# Patient Record
Sex: FEMALE | Race: BLACK OR AFRICAN AMERICAN | NOT HISPANIC OR LATINO | Employment: FULL TIME | URBAN - METROPOLITAN AREA
[De-identification: names, ages, dates, MRNs, and addresses within clinical notes are randomized per-mention and may not be internally consistent; named-entity substitution may affect disease eponyms.]

---

## 2019-10-29 DIAGNOSIS — M25.562 BILATERAL KNEE PAIN: Primary | ICD-10-CM

## 2019-10-29 DIAGNOSIS — M25.569 PAIN IN KNEE JOINT: ICD-10-CM

## 2019-10-29 DIAGNOSIS — M25.561 BILATERAL KNEE PAIN: Primary | ICD-10-CM

## 2019-10-30 DIAGNOSIS — M25.562 BILATERAL KNEE PAIN: Primary | ICD-10-CM

## 2019-10-30 DIAGNOSIS — M25.561 BILATERAL KNEE PAIN: Primary | ICD-10-CM

## 2019-10-30 DIAGNOSIS — M25.569 KNEE PAIN: ICD-10-CM

## 2019-11-11 DIAGNOSIS — M25.561 BILATERAL KNEE PAIN: Primary | ICD-10-CM

## 2019-11-11 DIAGNOSIS — M25.562 BILATERAL KNEE PAIN: Primary | ICD-10-CM

## 2019-11-11 DIAGNOSIS — M25.569 KNEE PAIN: ICD-10-CM

## 2019-11-29 ENCOUNTER — HOSPITAL ENCOUNTER (OUTPATIENT)
Dept: RADIOLOGY | Facility: HOSPITAL | Age: 54
Discharge: HOME | End: 2019-11-29
Attending: INTERNAL MEDICINE
Payer: MEDICAID

## 2019-11-29 DIAGNOSIS — M25.562 BILATERAL KNEE PAIN: ICD-10-CM

## 2019-11-29 DIAGNOSIS — M25.569 PAIN IN KNEE JOINT: ICD-10-CM

## 2019-11-29 DIAGNOSIS — M25.561 BILATERAL KNEE PAIN: ICD-10-CM

## 2019-11-29 PROCEDURE — 73564 XR KNEE COMP 4 OR MORE VIEWS BILAT: ICD-10-PCS | Mod: 26,50,, | Performed by: RADIOLOGY

## 2019-11-29 PROCEDURE — 73564 X-RAY EXAM KNEE 4 OR MORE: CPT | Mod: 50,TC,FY

## 2019-11-29 PROCEDURE — 73564 X-RAY EXAM KNEE 4 OR MORE: CPT | Mod: 26,50,, | Performed by: RADIOLOGY

## 2020-02-27 ENCOUNTER — CLINICAL SUPPORT (OUTPATIENT)
Dept: REHABILITATION | Facility: HOSPITAL | Age: 55
End: 2020-02-27
Payer: MEDICAID

## 2020-02-27 DIAGNOSIS — R29.898 WEAKNESS OF BOTH LOWER EXTREMITIES: ICD-10-CM

## 2020-02-27 DIAGNOSIS — M25.562 CHRONIC PAIN OF BOTH KNEES: ICD-10-CM

## 2020-02-27 DIAGNOSIS — R26.89 ANTALGIC GAIT: ICD-10-CM

## 2020-02-27 DIAGNOSIS — M25.661 DECREASED RANGE OF MOTION (ROM) OF BOTH KNEES: ICD-10-CM

## 2020-02-27 DIAGNOSIS — M25.662 DECREASED RANGE OF MOTION (ROM) OF BOTH KNEES: ICD-10-CM

## 2020-02-27 DIAGNOSIS — G89.29 CHRONIC PAIN OF BOTH KNEES: ICD-10-CM

## 2020-02-27 DIAGNOSIS — M25.561 CHRONIC PAIN OF BOTH KNEES: ICD-10-CM

## 2020-02-27 PROCEDURE — 97161 PT EVAL LOW COMPLEX 20 MIN: CPT | Mod: PO

## 2020-02-27 NOTE — PLAN OF CARE
"[facility]  Physical Therapy Initial Evaluation    Name: Susan Strong  Clinic Number: [id number]    Therapy Diagnosis:   Encounter Diagnoses   Name Primary?    Chronic pain of both knees     Decreased range of motion (ROM) of both knees     Antalgic gait     Weakness of both lower extremities      Physician: Mason Ye MD    Physician Orders: PT Eval and Treat   Medical Diagnosis from Referral: Bilateral knee pain (M25.561,M25.562); Pain in joint, lower leg (M25.569)  Evaluation Date: 2/27/2020  Authorization Period Expiration: 01/20/2021  Plan of Care Expiration: 05/21/2020  Visit # / Visits authorized: 1/ 1    Time In: 2:05 pm  Time Out: 3:10 pm  Total Billable Time: 65 minutes    Precautions: Standard and Fall    Subjective   Date of onset: Ongoing, chronic complaints of knee pain  History of current condition - SUSAN reports: problems with her knees for years.  She feels like her knees are "out of place" which can cause her to fall at times. Patient reports popping and clicking in knees. She has begun to notice increasing difficulty bending and climbing stairs.  She has trouble picking things up from the floor due to knee pain.  She reports swelling in knees.  She has noticed that she has more pain now than before which causes decreased walking speeds, unable to run.  Patient reports the last time she ran was 13-14 years ago and she stopped due to knee pain and giving out of both knees.  Patient reports symptoms bilaterally but the L is worse than the R.  Tingling and numbness reported behind B knees. Increased weight gain over the past 3 months of about 10 lbs, Denies change in bowel and bladder, reports some change in sensation in groin and saddle region as she reports an ache or cramping sensation that started a few years ago; blurry vision, dizziness and headaches that began about 13-14 years ago.  Patient reports occasionally using a cane for increased stability " when she goes out into the community.  Reports difficulty turning. Patient states she has fallen about 5x in the last 6 months with most recent being a couple weeks ago.  Knee pain and instability can interefere with taking care of her mother as she requires a lot of physical assistance.           Medical History:   No past medical history on file.   Hypertension - currently on medication    Surgical History:   Susan Strong  has no past surgical history on file.    years ago    Medications:   Susan currently has no medications in their medication list.   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  Prior Level of Function: Pt independent with all ADLs, job responsibilities and participating in regular physical activity   Current Level of Function: Pt has difficulty with all ADLs, job responsibilities and participating in regular physical activity     Pain:  Current 5/10, worst 10/10, best 5/10   Location: bilateral knee with L>R  Description: Aching, Throbbing, Numb and clicking and popping causing her knees to give out  Aggravating Factors: Standing, Bending, Walking, Night Time and stairs  Easing Factors: Cortisone provided temporary relief that returned; massage     Pts goals: going to the park with grandkids, walking, biking, stairs; decreasing pain    Objective     Observation:    Patient ambulates into clinic without AD; genu valgum in standing with slight APT and pronation at the feet; patient has a wide resting PRANAY     Palpation:  TTP with moderate palpation over joint line bilaterally with R>L    Flexibility:    Decreased hip and knee flexibility with pain globally affecting ROM       Range of Motion: P! = pain  Patient had pain with PROM before reaching end range.      Right AROM Left AROM   Hip - General Limited with P!  Limited with P!    Knee     Flexion 113 P!  110 P!    Extension -2 P!  -1 P!    Ankle - General WFL but P!  WFL but P!        MMT/Strength: P!= Pain    Hip Right Left   Flexion 4-/5 P! With extensor lag 3+/5 P! With extensor lag   Extension 3-/5 P!  3-/5 P!    Internal Rotation 4-/5 P!  3/5 P!    External Rotation 3-/5 P!  3-/5 P!    Adduction 3/5 P!  3-/5 P!    Abduction 3/5 3/5 P!    Knee     Flexion 4/5 P!  4-/5 P!    Extension 4/5 P!  4-/5 P!    Ankle     Dorsiflexion 3+/5 3+/5   Plantarflexion 3/5 3/5     Special Tests:     Left Right   Apley's Compression  (+) (+)   Apley's Distraction (+) (+)   Thessaly's Test (+) (+)   Patellar Grind Test (+) (-)     Joint mobility:  Superior patellar glides: hypomobile with pain over joint line, and around the superior and inferior poles of patella bilaterally  Inferior patellar glides: hypomobile with pain around superior patellar poles bilaterally  Medial/Lateral patellar glides: slight hypomobility    Other:   Gait: antalgic gait without AD in clinic; wide PRANAY with genu valgum; decreased heel strike bilaterally        CMS Impairment/Limitation/Restriction for FOTO Knee Survey    Therapist reviewed FOTO scores for Susan Strong on 2/27/2020.   FOTO documents entered into [employer] - see Media section.    Limitation Score: 74%  Category: Mobility    Current : CL = least 60% but < 80% impaired, limited or restricted  Goal: CK = at least 40% but < 60% impaired, limited or restricted             TREATMENT     Home Exercises and Patient Education Provided    Education provided:   Patient was educated on initial evaluation findings and expectations as well as future PT services, procedures, and expectations for optimal compliance with therapy.   - benefits of  physical therapy  - normal tissue reactions from IE/treatments  - additional imaging as needed    Written Home Exercises Provided: HEP will be provided at first treatment visit.     Assessment   Susan is a 55 y.o. female referred to outpatient Physical Therapy with a medical diagnosis of Bilateral knee pain; Pain in joint, lower leg. Pt presents with decreased ROM, strength, flexibility, reports of popping and clicking in B Knees in addition to positive special tests indicating potential meniscal involvement bilaterally as well as a positive patellar grind test on the L indicating patellofemoral pain, TTP with moderate palpation of joint line bilaterally with R>L, poor posture and increased pain causing difficulty walking and decreased participation with work, recreational and houseold duties.       Pt prognosis is Good.   Pt will benefit from skilled outpatient Physical Therapy to address the deficits stated above and in the chart below, provide pt/family education, and to maximize pt's level of independence to return to PLOF.     Plan of care discussed with patient: Yes  Pt's spiritual, cultural and educational needs considered and patient is agreeable to the plan of care and goals as stated below:     Anticipated Barriers for therapy: chronic condition, weight    Medical Necessity is demonstrated by the following  History  Co-morbidities and personal factors that may impact the plan of care Co-morbidities:   HTN, increased abdominal tissue    Personal Factors:   primary caretaker for elderly mother     low   Examination  Body Structures and Functions, activity limitations and participation restrictions that may impact the plan of care Body Regions:   lower extremities  trunk    Body Systems:    gross symmetry  ROM  strength  gross coordinated movement  balance  gait  transfers  transitions  motor control  motor learning    Participation Restrictions:   Unable to walk for long periods of time, difficulty  standing for long periods of time, fear of falling/knee instability, climbing stairs at home    Activity limitations:   Learning and applying knowledge  no deficits    General Tasks and Commands  no deficits    Communication  no deficits    Mobility  lifting and carrying objects  walking  stairs    Self care  washing oneself (bathing, drying, washing hands)  caring for body parts (brushing teeth, shaving, grooming)  looking after one's health    Domestic Life  shopping  cooking  doing house work (cleaning house, washing dishes, laundry)  assisting others    Interactions/Relationships  family relationships    Life Areas  no deficits    Community and Social Life  no deficits         low   Clinical Presentation unstable clinical presentation with unpredictable characteristics moderate   Decision Making/ Complexity Score: low     Goals:    In 4 weeks,   Goal Status   1. Patient will be independent with HEP to promote improved therapy outcomes.  STG    2. Patient will increase B Knee MMT to 4/5 to improve ambulation and stair navigation. STG    3. Patient will increase B Knee ROM from 0 degrees extension to 120 degrees flexion to improve functional mobility. STG        In 6 weeks,   Goal Status   4. Patient will be independent with progressed HEP to allow for self management of symptoms.  LTG    5. Patient will increase B Knee MMT to 5/5 for improved ambulation and stair navigation.  LTG    6. Patient will report stair navigation with reciprocal pattern and <2/10 pain to perform household chores without difficulty.  LTG    7. Patient will report walking for 30 minutes with <2/10 pain 5x/week to promote healthy lifestyle and regular physical exercise.  LTG    8. Patient will stand for 1 hr with <2/10 pain to prepare dinner.  LTG    9. Patient will perform a squat with proper form  and <2/10 pain to  objects from floor. LTG           Plan   Plan of care Certification: 2/27/2020 to 05/21/2020.    Outpatient Physical  Therapy 2 times weekly for 16 visits to include the following interventions: Cardiovascular, Closed Chain Strengthening, Core Stabilization, Flexibility (63066): improve muscle ROM, flexibility and  function, Home Exercise and Stretching, Patient Education, Plyometrics, Postural Awareness and  Training, Postural Stabilization, ROM Exercises (76726) : Passive or active activities to increase joint ROM, Strengthening  (21196): improve muscle strength and function., Therapeutic Exercise (91735): improve muscle strength, ROM, flexibility and muscle function., Gait Training (06656) : Improve overall gait function including stair climbing, Cross Friction Massage, Manual Stretching (72805): passive or active stretching to improve muscle length and function., Strain/Counter-Strain, Manual Traction, Myofascial Release , Peripheral Joint Mobilization, Soft Tissue Mobs (86301): increase ROM, tissue length, joint mechanics and modulate pain., Spine Mobilization  (22693): increase ROM, tissue length, joint mechanics and modulate pain., Massage (45991):, Combo E-Stim/Ultrasound, Cryotherapy (89187: Application of cold to decrease local swelling and decrease pain., Heat - 61520:  Application of heat to increase local circulation and decrease pain., Hi-Volt E-Stim  (): Application of electrical stimulation to modulate pain., IFC E-Stim (): Application of electrical stimulation to modulate pain., Mechanical Traction (18778), Micro-Current, Premodulated E-Stim  (): Application of electrical stimulation to modulate pain., TENs E-Stim  (): Application of electrical stimulation to modulate pain., Ultrasound (09405): increase local circulation, improve tissue healing time and modulate pain., Whirlpool (38895): increase local tissue circulation, improve elasticity of tissues, increased blood flow for improved muscle strength, ROM, flexiblity, and function., NMES E-stim ():  Application of electrical  stimulation for motor learning and control., Iontophoresis (85557), NMR (54227): re-education of movement, balance, coordination, kinesthetic sense, posture and proprioception, Self Care & Home Management (39965) - Self-care/home management training (e.g., activities of daily living [ADL] and compensatory training, meal preparation, safety procedures, and instructions in use of assistive technology devices/adaptive equipment), direct one-on-one contact (15 minutes), Therapeutic Activity (65086):  Use of dynamic activities to improve functional performance. Dry needling .     Mary Mcgee, PT

## 2020-03-30 ENCOUNTER — TELEPHONE (OUTPATIENT)
Dept: REHABILITATION | Facility: HOSPITAL | Age: 55
End: 2020-03-30

## 2020-03-30 NOTE — TELEPHONE ENCOUNTER
Called both numbers on file for patient after 4 pm as therapist was told patient would be available to discuss follow up call regarding HEP and questions/concerns regarding physical therapy.  No answer and unable to leave VM as it has not been set up for both numbers. Will try again at a later date to contact patient regarding COVID-19 updates and therapy services.    Mary Mcgee, PT, DPT  3/30/2020

## 2020-03-30 NOTE — TELEPHONE ENCOUNTER
PT was informed to call back after 4pm as patient should be available at that time.     Mary Mcgee, PT, DPT  3/30/2020

## 2020-04-07 ENCOUNTER — TELEPHONE (OUTPATIENT)
Dept: REHABILITATION | Facility: HOSPITAL | Age: 55
End: 2020-04-07

## 2020-04-07 NOTE — TELEPHONE ENCOUNTER
Called [phone] home number and did not get an answer Called cell phone number and did not get an answer.  Unable to leave VM at either number for patient follow up.       Mary Mcgee, PT, DPT  4/7/2020

## 2020-04-14 ENCOUNTER — TELEPHONE (OUTPATIENT)
Dept: REHABILITATION | Facility: HOSPITAL | Age: 55
End: 2020-04-14

## 2020-04-14 NOTE — TELEPHONE ENCOUNTER
Unable to leave as phone rang for 2+ minutes and there was no VM attached to [phone] number.     Attempted to contact additional phone number listed [phone] and patient answered.  Patient denied any questions with HEP and was informed that the clinic has begun virtual visits.  She states she is interested in participating in virtual visits and has necessary technology.  She was instructed to download the [employer] sheba for visits and to contact her insurance company with questions related to coverage/fees for virtual services.   Patient verbalized understanding to all.      Patient information was provided to  staff to set up a virtual appointment.     Mary Mcgee, PT, DPT  4/14/2020

## 2020-04-22 ENCOUNTER — TELEPHONE (OUTPATIENT)
Dept: REHABILITATION | Facility: HOSPITAL | Age: 55
End: 2020-04-22

## 2020-04-22 NOTE — TELEPHONE ENCOUNTER
Called patient for weekly follow up due to COVID19.  Patient did not answer and was unable to leave .     Mary Mcgee, PT, DPT  4/22/2020

## 2020-07-30 ENCOUNTER — LAB VISIT (OUTPATIENT)
Dept: PRIMARY CARE CLINIC | Facility: OTHER | Age: 55
End: 2020-07-30
Attending: INTERNAL MEDICINE
Payer: MEDICAID

## 2020-07-30 DIAGNOSIS — R05.9 COUGH: ICD-10-CM

## 2020-07-30 DIAGNOSIS — R06.02 SHORTNESS OF BREATH: ICD-10-CM

## 2020-07-30 PROCEDURE — U0003 INFECTIOUS AGENT DETECTION BY NUCLEIC ACID (DNA OR RNA); SEVERE ACUTE RESPIRATORY SYNDROME CORONAVIRUS 2 (SARS-COV-2) (CORONAVIRUS DISEASE [COVID-19]), AMPLIFIED PROBE TECHNIQUE, MAKING USE OF HIGH THROUGHPUT TECHNOLOGIES AS DESCRIBED BY CMS-2020-01-R: HCPCS

## 2020-07-31 LAB — SARS-COV-2 RNA RESP QL NAA+PROBE: NOT DETECTED

## 2024-03-15 ENCOUNTER — OCCUPATIONAL HEALTH (OUTPATIENT)
Dept: URGENT CARE | Facility: CLINIC | Age: 59
End: 2024-03-15

## 2024-03-15 DIAGNOSIS — Z13.9 ENCOUNTER FOR SCREENING: Primary | ICD-10-CM

## 2024-03-15 PROCEDURE — 80305 DRUG TEST PRSMV DIR OPT OBS: CPT | Mod: S$GLB,,, | Performed by: PHYSICIAN ASSISTANT
